# Patient Record
Sex: FEMALE | Race: WHITE | Employment: FULL TIME | URBAN - METROPOLITAN AREA
[De-identification: names, ages, dates, MRNs, and addresses within clinical notes are randomized per-mention and may not be internally consistent; named-entity substitution may affect disease eponyms.]

---

## 2020-04-28 ENCOUNTER — OFFICE VISIT (OUTPATIENT)
Dept: OBGYN CLINIC | Age: 30
End: 2020-04-28

## 2020-04-28 VITALS — WEIGHT: 163 LBS | SYSTOLIC BLOOD PRESSURE: 111 MMHG | BODY MASS INDEX: 27.98 KG/M2 | DIASTOLIC BLOOD PRESSURE: 66 MMHG

## 2020-04-28 DIAGNOSIS — N89.8 VAGINAL IRRITATION: Primary | ICD-10-CM

## 2020-04-28 DIAGNOSIS — Z12.4 SCREENING FOR CERVICAL CANCER: ICD-10-CM

## 2020-04-28 RX ORDER — TERCONAZOLE 4 MG/G
1 CREAM VAGINAL
Qty: 45 G | Refills: 0 | Status: SHIPPED | OUTPATIENT
Start: 2020-04-28

## 2020-04-28 RX ORDER — NYSTATIN AND TRIAMCINOLONE ACETONIDE 100000; 1 [USP'U]/G; MG/G
OINTMENT TOPICAL 2 TIMES DAILY
Qty: 1 TUBE | Refills: 3 | Status: SHIPPED | OUTPATIENT
Start: 2020-04-28 | End: 2020-05-05

## 2020-04-28 NOTE — PROGRESS NOTES
Threatened AB note    David Bates is a [de-identified] ,  31 y.o. female [facility]– [place] whose No LMP recorded., LMP 20 who presents for initial prenatal visit. She has had some cramping. She has not had any bleeding. Recent Tests:  She had a positive No components found for: SPEP, UPEP  pregnancy test on 20. She has had a recent pelvic ultrasound that showed TV ULTRASOUND PERFORMED. A POSSIBLE 5W5D GESTATIONAL SAC IS SEEN. THE GESTATIONAL SAC APPEARS TO BE IN AN UNUSUAL  LOCATION TOWARD THE RIGHT AND MAY HAVE A SEPTATION. A FOLLOW UP MAY BE HELPFUL TO DETERMINE VIABILITY. NO YOLK SAC IS SEEN. RIGHT & LEFT ADNEXA APPEAR WITHIN NORMAL LIMITS. NO FREE FLUID SEEN IN THE CDS. Holly Dolan She has not had prenatal care. Sheis having pelvic pain described as minimal located in the pelvis. She has not passed tissue. The patient denies lateralizing pelvic pain. She does have a history of a spontaneous . Her past medical history is significant for  has no past medical history on file. .   She has not had a recent injury or trauma. Additional complaints: she c/o vaginal irritation- she is on day 3 of metrogel     No past medical history on file.   Past Surgical History:   Procedure Laterality Date    HX WISDOM TEETH EXTRACTION      2011     Social History     Occupational History    Not on file   Tobacco Use    Smoking status: Never Smoker    Smokeless tobacco: Never Used   Substance and Sexual Activity    Alcohol use: No     Alcohol/week: 0.0 standard drinks     Comment: very rare    Drug use: No    Sexual activity: Yes     Partners: Male     Birth control/protection: Condom     Family History   Problem Relation Age of Onset    Cancer Father         Liver       Allergies   Allergen Reactions    Red Dye Rash     Prior to Admission medications    Not on File        Review of Systems: History obtained from the patient  Constitutional: negative for weight loss, fever, night sweats  Breast: negative for breast lumps, nipple discharge, galactorrhea  GI: negative for change in bowel habits, abdominal pain, black or bloody stools  : negative for frequency, dysuria, hematuria, vaginal discharge  MSK: negative for back pain, joint pain, muscle pain  Skin: negative for itching, rash, hives  Psych: negative for anxiety, depression, change in mood      Objective:    Physical Exam:   PHYSICAL EXAMINATION    Constitutional  · Appearance: well-nourished, well developed, alert, in no acute distress    Gastrointestinal  · Abdominal Examination: abdomen non-tender to palpation, normal bowel sounds, no masses present  · Liver and spleen: no hepatomegaly present, spleen not palpable  · Hernias: no hernias identified    Genitourinary  · External Genitalia: normal appearance for age, + discharge present, no tenderness present, no inflammatory lesions present, no masses present, no atrophy present  · Vagina: normal vaginal vault without central or paravaginal defects, + discharge present, no inflammatory lesions present, no masses present  · Bladder: non-tender to palpation  · Urethra: appears normal  · Cervix: normal   · Uterus: enlarged, soft, mildly tender with normal shape and consistency  · Adnexa: no adnexal tenderness present, no adnexal masses present  · Perineum: perineum within normal limits, no evidence of trauma, no rashes or skin lesions present  · Anus: anus within normal limits, no hemorrhoids present  · Inguinal Lymph Nodes: no lymphadenopathy present    Skin  · General Inspection: no rash, no lesions identified    Neurologic/Psychiatric  · Mental Status:  · Orientation: grossly oriented to person, place and time  · Mood and Affect: mood normal, affect appropriate    Assessment/Plan:   Threatened AB-  Early IUP vs nonviable. Will repeat ultrasound in 2 weeks to confirm viability.   Vaginal irritation- will f/u with nuswab, will treat with terazol and externally with mycolog

## 2020-04-30 LAB
CYTOLOGIST CVX/VAG CYTO: NORMAL
CYTOLOGY CVX/VAG DOC CYTO: NORMAL
CYTOLOGY CVX/VAG DOC THIN PREP: NORMAL
CYTOLOGY HISTORY:: NORMAL
DX ICD CODE: NORMAL
LABCORP, 190119: NORMAL
Lab: NORMAL
Lab: NORMAL
OTHER STN SPEC: NORMAL
STAT OF ADQ CVX/VAG CYTO-IMP: NORMAL

## 2020-05-04 LAB
A VAGINAE DNA VAG QL NAA+PROBE: NORMAL SCORE
BVAB2 DNA VAG QL NAA+PROBE: NORMAL SCORE
C ALBICANS DNA VAG QL NAA+PROBE: NEGATIVE
C GLABRATA DNA VAG QL NAA+PROBE: NEGATIVE
C TRACH DNA VAG QL NAA+PROBE: NEGATIVE
MEGA1 DNA VAG QL NAA+PROBE: NORMAL SCORE
N GONORRHOEA DNA VAG QL NAA+PROBE: NEGATIVE
T VAGINALIS DNA VAG QL NAA+PROBE: NEGATIVE

## 2020-05-14 ENCOUNTER — OFFICE VISIT (OUTPATIENT)
Dept: OBGYN CLINIC | Age: 30
End: 2020-05-14

## 2020-05-14 DIAGNOSIS — O03.9 MISCARRIAGE: Primary | ICD-10-CM

## 2020-05-14 NOTE — PROGRESS NOTES
Threatened AB note    Dinora Fitzgerald is a ,  31 y.o. female [facility] whose Patient's last menstrual period was 2020. Presenting to the office today for a viability foloow up ultrasound from 20. She states she has had vaginal bleeding for the last 2 weeks. The cramps are described as the same. Recent Tests:  She had a positive No components found for: SPEP, UPEP  pregnancy test on (DATE) . She has had a recent pelvic ultrasound that showed NO IUP seen: TA ULTRASOUND PERFORMED  NO IUP IS SEEN. THE UTERUS IS ANTEVERTED. THE ENDOMETRIUM IS SLIGHTLY HETEROGENOUS AND MEASURES 5-6MM. NO BLOODFLOW IS SEEN IN THE  ENDOMETRIUM. RIGHT OVARY APPEARS WITHIN NORMAL LIMITS. LEFT OVARY APPEARS WITHIN NORMAL LIMITS. NO FREE FLUID SEEN IN THE CDS. Her past medical history is significant for  has no past medical history on file. .   She has not had a recent injury or trauma. No past medical history on file. Past Surgical History:   Procedure Laterality Date    HX WISDOM TEETH EXTRACTION      2011     Social History     Occupational History    Not on file   Tobacco Use    Smoking status: Never Smoker    Smokeless tobacco: Never Used   Substance and Sexual Activity    Alcohol use: No     Alcohol/week: 0.0 standard drinks     Comment: very rare    Drug use: No    Sexual activity: Yes     Partners: Male     Birth control/protection: Condom     Family History   Problem Relation Age of Onset    Cancer Father         Liver       Allergies   Allergen Reactions    Red Dye Rash     Prior to Admission medications    Medication Sig Start Date End Date Taking? Authorizing Provider   terconazole (TERAZOL 7) 0.4 % vaginal cream Insert 1 Applicator into vagina nightly.  20   Adia Hernandez MD        Review of Systems: History obtained from the patient  Constitutional: negative for weight loss, fever, night sweats  Breast: negative for breast lumps, nipple discharge, galactorrhea  GI: negative for change in bowel habits, abdominal pain, black or bloody stools  : negative for frequency, dysuria, hematuria, vaginal discharge  MSK: negative for back pain, joint pain, muscle pain  Skin: negative for itching, rash, hives  Psych: negative for anxiety, depression, change in mood      Objective:  Visit Vitals  [facility] 02/25/2020       Physical Exam:   PHYSICAL EXAMINATION    Constitutional  · Appearance: well-nourished, well developed, alert, in no acute distress    Gastrointestinal  · Abdominal Examination: abdomen non-tender to palpation, normal bowel sounds, no masses present  · Liver and spleen: no hepatomegaly present, spleen not palpable  · Hernias: no hernias identified    Skin  · General Inspection: no rash, no lesions identified    Neurologic/Psychiatric  · Mental Status:  · Orientation: grossly oriented to person, place and time  · Mood and Affect: mood normal, affect appropriate    Assessment/Plan:   Complete miscarriage- condolences offered. will check blood type today.     Bleeding precautions discussed  rto prn

## 2020-05-15 LAB
ABO GROUP BLD: NORMAL
BLD GP AB SCN SERPL QL: NEGATIVE
RH BLD: POSITIVE

## 2024-06-24 ENCOUNTER — OFFICE VISIT (OUTPATIENT)
Age: 34
End: 2024-06-24
Payer: COMMERCIAL

## 2024-06-24 VITALS — WEIGHT: 190.2 LBS | SYSTOLIC BLOOD PRESSURE: 117 MMHG | DIASTOLIC BLOOD PRESSURE: 77 MMHG

## 2024-06-24 DIAGNOSIS — Z12.4 ENCOUNTER FOR PAPANICOLAOU SMEAR FOR CERVICAL CANCER SCREENING: Primary | ICD-10-CM

## 2024-06-24 DIAGNOSIS — A64 STD (FEMALE): ICD-10-CM

## 2024-06-24 DIAGNOSIS — Z01.419 ENCOUNTER FOR GYNECOLOGICAL EXAMINATION: ICD-10-CM

## 2024-06-24 PROCEDURE — 99395 PREV VISIT EST AGE 18-39: CPT | Performed by: OBSTETRICS & GYNECOLOGY

## 2024-06-24 NOTE — PROGRESS NOTES
Giana Parker is a 34 y.o. female returns for an annual exam     Chief Complaint   Patient presents with    Annual Exam       Patient's last menstrual period was 06/03/2024.  Her periods are moderate in flow and usually regular with a 26-32 day interval with 3-7 day duration.  She does not have dysmenorrhea.  Problems: problems - vaginal dryness, and itching   Birth Control: none.  Last Pap: normal obtained 3 year(s) ago.  She does not have a history of DILMA 2, 3 or cervical cancer.   With regard to the Gardisil vaccine, she has not received it yet      1. Have you been to the ER, urgent care clinic, or hospitalized since your last visit? No    2. Have you seen or consulted any other health care providers outside of the [facility] since your last visit? No    Examination chaperoned by Felix aMson LPN.

## 2024-06-24 NOTE — PROGRESS NOTES
Annual exam  Giana Parker is a No obstetric history on file.,  34 y.o. female   Patient's last menstrual period was 06/03/2024.    She presents for her annual checkup.     She reports vaginal dryness and irching    Sexual history:    She  reports being sexually active and has had partner(s) who are male. She reports using the following method of birth control/protection: Condom.    Per Nursing Note:  Patient's last menstrual period was 06/03/2024.  Her periods are moderate in flow and usually regular with a 26-32 day interval with 3-7 day duration.  She does not have dysmenorrhea.  Problems: problems - vaginal dryness, and itching   Birth Control: none.  Last Pap: normal obtained 3 year(s) ago.  She does not have a history of DILMA 2, 3 or cervical cancer.   With regard to the Gardisil vaccine, she has not received it yet    History reviewed. No pertinent past medical history.  Past Surgical History:   Procedure Laterality Date    WISDOM TOOTH EXTRACTION      Jan 2011       Current Outpatient Medications   Medication Sig Dispense Refill    terconazole (TERAZOL 7) 0.4 % vaginal cream Place 1 applicator vaginally (Patient not taking: Reported on 6/24/2024)       No current facility-administered medications for this visit.     Allergies: Red dye     Tobacco History:  reports that she has never smoked. She has never used smokeless tobacco.  Alcohol Abuse:  reports no history of alcohol use.  Drug Abuse:  reports no history of drug use.    Family Medical/Cancer History:   Family History   Problem Relation Age of Onset    Cancer Father         Liver        Review of Systems - History obtained from the patient  Constitutional: negative for weight loss, fever, night sweats  HEENT: negative for hearing loss, earache, congestion, snoring, sorethroat  CV: negative for chest pain, palpitations, edema  Resp: negative for cough, shortness of breath, wheezing  GI: negative for change in bowel habits, abdominal pain, black or

## 2024-06-25 LAB
HBV SURFACE AG SERPL QL IA: NEGATIVE
HCV IGG SERPL QL IA: NON REACTIVE
HIV 1+2 AB+HIV1 P24 AG SERPL QL IA: NON REACTIVE
RPR SER QL: NON REACTIVE

## 2024-06-26 LAB
A VAGINAE DNA VAG QL NAA+PROBE: ABNORMAL SCORE
BVAB2 DNA VAG QL NAA+PROBE: ABNORMAL SCORE
C ALBICANS DNA VAG QL NAA+PROBE: NEGATIVE
C GLABRATA DNA VAG QL NAA+PROBE: NEGATIVE
C TRACH DNA SPEC QL NAA+PROBE: NEGATIVE
CYTOLOGIST CVX/VAG CYTO: NORMAL
CYTOLOGY CVX/VAG DOC CYTO: NORMAL
CYTOLOGY CVX/VAG DOC THIN PREP: NORMAL
DX ICD CODE: NORMAL
HPV GENOTYPE REFLEX: NORMAL
HPV I/H RISK 4 DNA CVX QL PROBE+SIG AMP: NEGATIVE
Lab: NORMAL
MEGA1 DNA VAG QL NAA+PROBE: ABNORMAL SCORE
N GONORRHOEA DNA VAG QL NAA+PROBE: NEGATIVE
OTHER STN SPEC: NORMAL
STAT OF ADQ CVX/VAG CYTO-IMP: NORMAL
T VAGINALIS DNA VAG QL NAA+PROBE: NEGATIVE